# Patient Record
Sex: MALE | Race: WHITE | NOT HISPANIC OR LATINO | Employment: OTHER | URBAN - METROPOLITAN AREA
[De-identification: names, ages, dates, MRNs, and addresses within clinical notes are randomized per-mention and may not be internally consistent; named-entity substitution may affect disease eponyms.]

---

## 2024-04-09 ENCOUNTER — APPOINTMENT (OUTPATIENT)
Dept: RADIOLOGY | Facility: CLINIC | Age: 79
End: 2024-04-09
Payer: MEDICARE

## 2024-04-09 VITALS
WEIGHT: 250 LBS | SYSTOLIC BLOOD PRESSURE: 137 MMHG | HEIGHT: 70 IN | DIASTOLIC BLOOD PRESSURE: 78 MMHG | HEART RATE: 90 BPM | BODY MASS INDEX: 35.79 KG/M2

## 2024-04-09 DIAGNOSIS — M25.562 ACUTE PAIN OF LEFT KNEE: ICD-10-CM

## 2024-04-09 DIAGNOSIS — M17.12 PRIMARY OSTEOARTHRITIS OF LEFT KNEE: Primary | ICD-10-CM

## 2024-04-09 PROCEDURE — 73562 X-RAY EXAM OF KNEE 3: CPT

## 2024-04-09 PROCEDURE — 73564 X-RAY EXAM KNEE 4 OR MORE: CPT

## 2024-04-09 PROCEDURE — 99203 OFFICE O/P NEW LOW 30 MIN: CPT | Performed by: ORTHOPAEDIC SURGERY

## 2024-04-09 RX ORDER — AZITHROMYCIN 250 MG/1
TABLET, FILM COATED ORAL
COMMUNITY
Start: 2024-01-05

## 2024-04-09 RX ORDER — LORATADINE 10 MG/1
10 TABLET ORAL DAILY
COMMUNITY

## 2024-04-09 RX ORDER — ROSUVASTATIN CALCIUM 20 MG/1
20 TABLET, COATED ORAL DAILY
COMMUNITY

## 2024-04-09 RX ORDER — CLOPIDOGREL BISULFATE 75 MG/1
TABLET ORAL
COMMUNITY
Start: 2024-04-02

## 2024-04-09 NOTE — PROGRESS NOTES
Ortho Sports Medicine New Patient Visit     Assesment:   78 y.o. male with severe left primary knee osteoarthritis    Plan:    We had a long discussion regarding left knee degenerative joint disease, including treatment options. We discussed non-operative treatment options, such as physical therapy, bracing options, voltaren gel, ibuprofen, steroid injections, viscosupplementation injections, PRP injections, and weight management. The patient elected to proceed with formal PT, medial  brace, and weight management at this time. There is valgus gapping on exam today, so I believe the brace will beneficial for this patient. Given his short-term relief following previous injections, the patient is not interested in injection options at this time. The patient is interested in self-guided weight loss at this time. He did inquire about PRP injections. Because of his severe medial compartment osteoarthritis as reviewed on x-rays today, I do not believe these would be especially beneficial.  However, there is minimal downside if he would like to try them.  If the patient would like to consider PRP in the future, we can refer him to one of my colleagues who performs these. The patient is aware of the out-of-pocket cost.    Ultimately, the patient's extent of arthritic changes would make them a candidate for a knee replacement. If the patient's symptoms do not respond well to the various non-operative interventions described above and is affecting their quality of life, we can refer them to a joint replacement specialist to discuss surgical options.     We will plan to follow up with Jaskaran in 6-8 weeks following PT.        Chief Complaint   Patient presents with    Left Knee - Pain       History of Present Illness:    The patient is a 78 y.o. male presenting for initial evaluation of chronic, atraumatic left knee pain primarily localized to the medial aspect of the knee. The patient has been treated for this by  "[facility] with multiple steroid injections and one series of Visco injections with minimal, short-term pain relief. He is here for a second opinion to discuss treatment options and review left knee MRI from 3/19/24. The patient is not currently using any home therapies. He denies trying bracing or recent formal PT. The patient enjoys golfing but is unable to do so currently secondary to pain. He does note the knee feels it is going to \"give out\" on him occasionally. The patient denies swelling, locking, and numbness/tingling.     The patient's PMH is significant for s/p TAVR, HLD, obesity, prostate cancer. The patient is on Plavix at baseline following his procedure. He denies recent chest pain, SOB, or dyspnea. The patient also denies history of diabetes and is a former smoker. He has not smoked for at least 45 years.      Knee Surgical History:  None    Past Medical, Social and Family History:  Past Medical History:   Diagnosis Date    Coronary artery disease 2023    PAD (peripheral artery disease) (HCC) 2024    both legs     Past Surgical History:   Procedure Laterality Date    PROSTATE CANCER GENE 3(PCA3) (HISTORICAL)  2022    REPLACEMENT AORTIC VALVE TRANSCATHETER (TAVR)  2023     No Known Allergies  Current Outpatient Medications on File Prior to Visit   Medication Sig Dispense Refill    azithromycin (ZITHROMAX) 250 mg tablet       clopidogrel (PLAVIX) 75 mg tablet       loratadine (CLARITIN) 10 mg tablet Take 10 mg by mouth daily      rosuvastatin (CRESTOR) 20 MG tablet Take 20 mg by mouth daily       No current facility-administered medications on file prior to visit.     Social History     Socioeconomic History    Marital status: /Civil Union     Spouse name: Not on file    Number of children: Not on file    Years of education: Not on file    Highest education level: Not on file   Occupational History    Not on file   Tobacco Use    Smoking status: Never    Smokeless tobacco: Never " "  Substance and Sexual Activity    Alcohol use: Yes     Comment: occ    Drug use: Never    Sexual activity: Not on file   Other Topics Concern    Not on file   Social History Narrative    Not on file     Social Determinants of Health     Financial Resource Strain: Not on file   Food Insecurity: Not on file   Transportation Needs: Not on file   Physical Activity: Not on file   Stress: Not on file   Social Connections: Not on file   Intimate Partner Violence: Not on file   Housing Stability: Not on file         I have reviewed the past medical, surgical, social and family history, medications and allergies as documented in the EMR.    Review of systems: ROS is negative other than that noted in the HPI.  Constitutional: Negative for fatigue and fever.   HENT: Negative for sore throat.    Respiratory: Negative for shortness of breath.    Cardiovascular: Negative for chest pain.   Gastrointestinal: Negative for abdominal pain.   Endocrine: Negative for cold intolerance and heat intolerance.   Genitourinary: Negative for flank pain.   Musculoskeletal: Negative for back pain.   Skin: Negative for rash.   Allergic/Immunologic: Negative for immunocompromised state.   Neurological: Negative for dizziness.   Psychiatric/Behavioral: Negative for agitation.      Physical Exam:    Blood pressure 137/78, pulse 90, height 5' 10\" (1.778 m), weight 113 kg (250 lb).    General/Constitutional: NAD, well developed, well nourished  HENT: Normocephalic, atraumatic  CV: Intact distal pulses, regular rate  Resp: No respiratory distress or labored breathing  Lymphatic: No lymphadenopathy palpated  Neuro: Alert and Oriented x 3, no focal deficits  Psych: Normal mood, normal affect  Skin: Warm, dry, no rashes, no erythema      Knee Exam:   No significant skin lesions or deformity. No significant joint effusion Range of motion from 0 to 125.  Mild-moderate medial joint line tenderness. Mild pes anserine tenderness. Valgus gapping with firm " endpoint to valgus stress. Knee is stable to varus stress. Neurovascularly intact distally      Knee Imaging    X-rays of the left knee reviewed and interpreted today. These show no acute fractures. Severe medial compartment and mild patellofemoral compartment degenerative changes. The patella is well centered on the trochlea.    MRI report of the left knee reviewed today from 3/19/24, which demonstrates complex tearof the body and posterior horn of the medial meniscus. Partial tearing of medial meniscus anterior root. Medial compartment joint space narrowing with diffuse full-thickness cartilage loss and subchondral edema. High-grade patellofemoral chondromalacia. MCL sprain. Patellar tendinopathy.

## 2024-04-16 ENCOUNTER — DOCUMENTATION (OUTPATIENT)
Dept: PAIN MEDICINE | Facility: CLINIC | Age: 79
End: 2024-04-16

## 2024-04-16 ENCOUNTER — TELEPHONE (OUTPATIENT)
Age: 79
End: 2024-04-16

## 2024-04-16 NOTE — PROGRESS NOTES
I was able to get an email back about the medial  brace. I was informed by Carmina that he spoke with the patient and put in the pre-authorization in order to get the brace fitting process started.

## 2024-04-16 NOTE — TELEPHONE ENCOUNTER
Caller: Self    Doctor: Robert    Reason for call: Patient has not heard from brace fitter yet. Is there a number he can call or a store he can go to?    Call back#: [phone]

## 2024-04-16 NOTE — TELEPHONE ENCOUNTER
I reached out to DME in order to get an update about this brace. So I told the patient that they should hopefully hear from them by the end of this week and if they do not I told them to call the office back for an update.

## 2024-04-22 ENCOUNTER — EVALUATION (OUTPATIENT)
Dept: PHYSICAL THERAPY | Facility: CLINIC | Age: 79
End: 2024-04-22
Payer: MEDICARE

## 2024-04-22 DIAGNOSIS — M25.562 CHRONIC PAIN OF LEFT KNEE: Primary | ICD-10-CM

## 2024-04-22 DIAGNOSIS — M17.12 PRIMARY OSTEOARTHRITIS OF LEFT KNEE: ICD-10-CM

## 2024-04-22 DIAGNOSIS — G89.29 CHRONIC PAIN OF LEFT KNEE: Primary | ICD-10-CM

## 2024-04-22 PROCEDURE — 97161 PT EVAL LOW COMPLEX 20 MIN: CPT

## 2024-04-22 PROCEDURE — 97110 THERAPEUTIC EXERCISES: CPT

## 2024-04-22 NOTE — PROGRESS NOTES
PT Evaluation     Today's date: 2024  Patient name: Jaskaran Lopez  : 1945  MRN: [id number]  Referring provider: Claribel Nye PA-C  Dx:   Encounter Diagnosis     ICD-10-CM    1. Chronic pain of left knee  M25.562     G89.29       2. Primary osteoarthritis of left knee  M17.12 Ambulatory Referral to Physical Therapy          Start Time: 0845  Stop Time: 930  Total time in clinic (min): 45 minutes    Assessment  Assessment details: Pt is a 78 y.o male who presents to skilled physical therapy with complaints of chronic L knee pain for over 6 months. Pt demonstrated an asymmetric and antalgic gait pattern, impaired balance, altered functional mechanics with squats and steps, and decreased L knee MMT and A/PROM. Pt's hx and clinical findings are consistent with the referring diagnosis of L knee osteoarthritis. Pt's pain and deficits are preventing him from performing self care, ADLS, and recreational activities without compensations. Pt was educated on his diagnosis, prognosis, POC, and HEP. Pt would benefit from skilled physical therapy to reduce his pain, address his deficits, progress towards his goals, and return to his PLOF.   Impairments: abnormal coordination, abnormal gait, abnormal or restricted ROM, abnormal movement, activity intolerance, impaired balance, impaired physical strength, lacks appropriate home exercise program, pain with function, safety issue and poor body mechanics    Symptom irritability: moderateUnderstanding of Dx/Px/POC: good   Prognosis: good    Goals  Short Term Goals:  1) Pt will initiate and progress his HEP in 3-4 weeks.  2) Pt will improve his knee MMT to improve his ADLs in 3-4 weeks.  3) Pt will improve his knee ROM to be symmetrical to his R knee to improve his gait in 3-4 weeks.    Long Term Goals:  1) Pt will be able to ambulate for at least 15 mins with less than 2/10 pain in 6-8 weeks.  2) Pt will be able to ambulate up and down 15 steps with less than 2/10 pain in  6-8 weeks.  3) Pt will be able to perform his ADLs with less than 3/10 pain in 6-8 weeks.     Plan  Patient would benefit from: PT eval and skilled physical therapy  Planned modality interventions: cryotherapy and thermotherapy: hydrocollator packs  Planned therapy interventions: activity modification, ADL retraining, joint mobilization, manual therapy, balance, motor coordination training, body mechanics training, coordination, neuromuscular re-education, patient education, postural training, self care, flexibility, functional ROM exercises, gait training, graded exercise, stretching, strengthening, therapeutic activities, therapeutic exercise and home exercise program  Frequency: 2x week  Duration in weeks: 6  Plan of Care beginning date: 2024  Plan of Care expiration date: 6/3/2024  Treatment plan discussed with: patient        Subjective Evaluation    History of Present Illness  Mechanism of injury: Pt was treated for prostate cancer for 2 years, he has his first follow-up in 2 weeks to ensure his tests are normal. Pt had an aortic valve replaced. Pt had several procedures on his legs and may need another procedure. Pt has been having about 6 months worth of pain on his L knee. Pt's balance is tough as well as going up/down stairs, he requires railings and going up one step at a time. Pt goes grocery shopping but needs the cart for stability. Pt wants to lose weight and play golf. Walking in his yard is very tough due to the pain and stability issues. Pt takes tylenol in the morning to help with the pain. No previous surgeries, had injections in the past but hasn't had any relief.   Patient Goals  Patient goals for therapy: decreased pain, improved balance, increased motion, increased strength, independence with ADLs/IADLs and return to sport/leisure activities  Patient goal: walk, be active, safe, golf  Pain  Current pain ratin  At best pain ratin  At worst pain ratin  Location: medial joint  "line of L knee  Quality: dull ache and burning  Relieving factors: medications  Aggravating factors: standing, walking, stair climbing and running  Progression: worsening    Social Support  Steps to enter house: yes  11  Stairs in house: yes   Lives in: multiple-level home  Lives with: spouse    Employment status: not working  Exercise history: walking      Diagnostic Tests  X-ray: abnormal        Objective     Tenderness     Additional Tenderness Details  No TTP    Active Range of Motion   Left Knee   Flexion: 113 degrees   Extension: -4 degrees     Right Knee   Flexion: 120 degrees   Extension: 0 degrees     Passive Range of Motion   Left Knee   Flexion: 119 degrees with pain  Extension: -2 degrees with pain    Right Knee   Flexion: 123 degrees   Extension: 1 degrees     Strength/Myotome Testing     Left Knee   Flexion: 4-  Extension: 3+  Quadriceps contraction: fair    Right Knee   Flexion: 4+  Extension: 4+  Quadriceps contraction: good    Ambulation   Weight-Bearing Status   Weight-Bearing Status (Left): full weight bearing   Weight-Bearing Status (Right): full weight-bearing    Assistive device used: none    Observational Gait   Gait: antalgic and asymmetric   Increased right stance time and left swing time. Decreased walking speed, stride length, left stance time, right swing time, left step length and right step length.   Left foot contact pattern: foot flat  Right foot contact pattern: foot flat  Left arm swing: decreased  Right arm swing: decreased  Base of support: increased    Additional Observational Gait Details  Varus deformity of L knee    Functional Assessment      Squat    Pain, left tibial anterior translation beyond toes and right tibial anterior translation beyond toes.     Forward Step Up 6\"   Left Leg  Pain and increased contralateral push off.     Right Leg  Within functional limits.     Forward Step Down 6\"   Left Leg  Pain, contralateral trunk side bending and contralateral toe touch first. "     Right Leg  Within functional limits.     Single Leg Stance   Left: 2 seconds  Right: 9 seconds                Insurance:  A/CMS Eval/ Re-eval Auth #/ Referral # Total units or visits Start date  Expiration date KX? Visit limitation?  PT only or  PT+OT? Co-Insurance   CMS 4/22/24 4/9/24 4/9/25    100%                 POC Start Date POC Expiration Date Signed POC?   4/22/24 6/3/24 pending      Date 4/22              Visits/Units:  Used 1              Authed:  Remaining                      Precautions: CAD, PAD, aortic valve replacement, hx of cancer  HEP: Access Code [email or web address]    Date     4/22/24   Visit Number     1 (IE)   Manuals                                        Neuro Re-Ed                                                                 Ther Ex                                                                        Ther Activity                        Gait Training                        Modalities

## 2024-04-25 ENCOUNTER — OFFICE VISIT (OUTPATIENT)
Dept: PHYSICAL THERAPY | Facility: CLINIC | Age: 79
End: 2024-04-25
Payer: MEDICARE

## 2024-04-25 DIAGNOSIS — M17.12 PRIMARY OSTEOARTHRITIS OF LEFT KNEE: Primary | ICD-10-CM

## 2024-04-25 DIAGNOSIS — G89.29 CHRONIC PAIN OF LEFT KNEE: ICD-10-CM

## 2024-04-25 DIAGNOSIS — M25.562 CHRONIC PAIN OF LEFT KNEE: ICD-10-CM

## 2024-04-25 PROCEDURE — 97112 NEUROMUSCULAR REEDUCATION: CPT

## 2024-04-25 PROCEDURE — 97110 THERAPEUTIC EXERCISES: CPT

## 2024-04-25 NOTE — TELEPHONE ENCOUNTER
[facility] emailed me and stated that they were able to contact the patient regarding this brace. So the patient should have an appointment set to get this fitted.

## 2024-04-25 NOTE — PROGRESS NOTES
[facility] reached out to me and informed me that they were able to get in contact with the patient and set an appointment to get him fitted for a brace.

## 2024-04-25 NOTE — TELEPHONE ENCOUNTER
Patient is calling back again to talk to Rosalba. He still has not received the brace or a call regarding it. He is getting frustrated and would like you to call him back please. Thank you.  His # is [phone]

## 2024-04-25 NOTE — PROGRESS NOTES
Daily Note     Today's date: 2024  Patient name: Jaskaran Lopez  : 1945  MRN: [id number]  Referring provider: Claribel Nye PA-C  Dx:   Encounter Diagnosis     ICD-10-CM    1. Primary osteoarthritis of left knee  M17.12       2. Chronic pain of left knee  M25.562     G89.29           Start Time: 935  Stop Time: 1015  Total time in clinic (min): 40 minutes    Subjective: Pt reports that his hip and lateral L knee were very sore after his evaluation and after doing his HEP. Pt states diligently performing his exercises, but wasn't as sore as the first day. Pt states 5/10 pain prior to the start of his session.       Objective: See treatment diary below      Assessment: Tolerated treatment well. Patient demonstrated fatigue post treatment, exhibited good technique with therapeutic exercises, and would benefit from continued PT      Plan: Continue per plan of care.  Progress treatment as tolerated.        Insurance:  A/CMS Eval/ Re-eval Auth #/ Referral # Total units or visits Start date  Expiration date KX? Visit limitation?  PT only or  PT+OT? Co-Insurance   CMS 24    100%                 POC Start Date POC Expiration Date Signed POC?   4/22/24 6/3/24 pending      Date              Visits/Units:  Used 1 2             Authed:  Remaining                      Precautions: CAD, PAD, aortic valve replacement, hx of cancer  HEP: Access Code [email or web address]    Date    24   Visit Number    2 1 (IE)   Manuals                                        Neuro Re-Ed         Quad sets    2x10 B    SLR    1x15    bridges    2x10    Bent knee fallout    RTB 2x10 B    LAQ    2x10    TKE    [place] 5.5 1x10            Ther Ex        HS str     3x30s B    Heel slides    2x10                                                    Ther Activity                        Gait Training                        Modalities

## 2024-04-25 NOTE — TELEPHONE ENCOUNTER
I attempted to reach out to [employer] twice through via. I will forward my email once again and notify the patient if I receive any updates.

## 2024-04-29 ENCOUNTER — OFFICE VISIT (OUTPATIENT)
Dept: PHYSICAL THERAPY | Facility: CLINIC | Age: 79
End: 2024-04-29
Payer: MEDICARE

## 2024-04-29 DIAGNOSIS — M25.562 CHRONIC PAIN OF LEFT KNEE: ICD-10-CM

## 2024-04-29 DIAGNOSIS — G89.29 CHRONIC PAIN OF LEFT KNEE: ICD-10-CM

## 2024-04-29 DIAGNOSIS — M17.12 PRIMARY OSTEOARTHRITIS OF LEFT KNEE: Primary | ICD-10-CM

## 2024-04-29 PROCEDURE — 97112 NEUROMUSCULAR REEDUCATION: CPT

## 2024-04-29 PROCEDURE — 97110 THERAPEUTIC EXERCISES: CPT

## 2024-04-29 NOTE — PROGRESS NOTES
Daily Note     Today's date: 2024  Patient name: Jaskaran Lopez  : 1945  MRN: [id number]  Referring provider: Claribel Nye PA-C  Dx:   Encounter Diagnosis     ICD-10-CM    1. Primary osteoarthritis of left knee  M17.12       2. Chronic pain of left knee  M25.562     G89.29           Start Time: 849  Stop Time: 930  Total time in clinic (min): 41 minutes    Subjective: Pt reports attending a wedding over the weekend and being on his feet a lot so his BLE were a little more sore and painful. Pt states being consistent with his HEP, his hips were sore after his last session. No knee pain prior to the start of his session at rest.       Objective: See treatment diary below      Assessment: Tolerated treatment well. Patient demonstrated fatigue post treatment, exhibited good technique with therapeutic exercises, and would benefit from continued PT. Pt given VC for proper squatting mechanics to avoid knee valgus and engage glutes. Pt didn't have any increase in knee pain throughout the session and demonstrated good carryover form the previous session.       Plan: Continue per plan of care.  Progress treatment as tolerated.        Insurance:  A/CMS Eval/ Re-eval Auth #/ Referral # Total units or visits Start date  Expiration date KX? Visit limitation?  PT only or  PT+OT? Co-Insurance   CMS 24    100%                 POC Start Date POC Expiration Date Signed POC?   4/22/24 6/3/24 pending      Date             Visits/Units:  Used 1 2 3            Authed:  Remaining                      Precautions: CAD, PAD, aortic valve replacement, hx of cancer  HEP: Access Code [email or web address]    Date   24   Visit Number   3 2 1 (IE)   Manuals                                        Neuro Re-Ed         Quad sets   2x10 L 2x10 B    SLR   1x15 1x15    bridges   2x10 2x10    Bent knee fallout   RTB 2x10 B RTB 2x10 B    LAQ   3lbs 2x10 2x10    TKE    [place] 5.5 1x10    Mini squats    2x10     Ther Ex        HS str     3x30s B    Heel slides   2x10 2x10    Standing HS curl   3lbs 2x10     Side stepping   At bar 2 laps                                     Ther Activity                        Gait Training                        Modalities

## 2024-05-02 ENCOUNTER — OFFICE VISIT (OUTPATIENT)
Dept: PHYSICAL THERAPY | Facility: CLINIC | Age: 79
End: 2024-05-02
Payer: MEDICARE

## 2024-05-02 DIAGNOSIS — M25.562 CHRONIC PAIN OF LEFT KNEE: ICD-10-CM

## 2024-05-02 DIAGNOSIS — M17.12 PRIMARY OSTEOARTHRITIS OF LEFT KNEE: Primary | ICD-10-CM

## 2024-05-02 DIAGNOSIS — G89.29 CHRONIC PAIN OF LEFT KNEE: ICD-10-CM

## 2024-05-02 PROCEDURE — 97112 NEUROMUSCULAR REEDUCATION: CPT

## 2024-05-02 PROCEDURE — 97110 THERAPEUTIC EXERCISES: CPT

## 2024-05-02 NOTE — PROGRESS NOTES
Daily Note     Today's date: 2024  Patient name: Jaskaran Lopez  : 1945  MRN: [id number]  Referring provider: Claribel Nye PA-C  Dx:   Encounter Diagnosis     ICD-10-CM    1. Primary osteoarthritis of left knee  M17.12       2. Chronic pain of left knee  M25.562     G89.29           Start Time: 925  Stop Time: 1015  Total time in clinic (min): 50 minutes    Subjective: Pt states 6/10 pain when he is standing or walking, when he is unloaded it is typically fine. Pt states it was bothering him sleeping the other night. Pt got his knee brace which he states immediately helped with some of his pain walking.       Objective: See treatment diary below      Assessment: Tolerated treatment well. Patient demonstrated fatigue post treatment, exhibited good technique with therapeutic exercises, and would benefit from continued PT      Plan: Continue per plan of care.  Progress treatment as tolerated.        Insurance:  A/CMS Eval/ Re-eval Auth #/ Referral # Total units or visits Start date  Expiration date KX? Visit limitation?  PT only or  PT+OT? Co-Insurance   CMS 24    100%                 POC Start Date POC Expiration Date Signed POC?   4/22/24 6/3/24 pending      Date            Visits/Units:  Used 1 2 3 4           Authed:  Remaining                      Precautions: CAD, PAD, aortic valve replacement, hx of cancer  HEP: Access Code [email or web address]    Date  24   Visit Number  4 3 2 1 (IE)   Manuals                                        Neuro Re-Ed         Quad sets   2x10 L 2x10 B    SLR  1x15 1x15 1x15    bridges  1x15  1x15 w/march 2x10 2x10    clamshells  W/VC 2x10 B      Bent knee fallout   RTB 2x10 B RTB 2x10 B    LAQ  5lbs 2x10 B 3lbs 2x10 2x10    TKE    [place] 5.5 1x10    STS  1x10 from chair      Mini squats   2x10     Foam balance  Feet together 1x30s    No airex tandem 2x30s      Ther Ex        HS str   3x30s B  3x30s B    Heel slides   2x10  2x10    Standing HS curl  5lbs 2x10 B 3lbs 2x10     Side stepping  At bar 3 laps At bar 2 laps                                     Ther Activity                        Gait Training                        Modalities

## 2024-05-06 ENCOUNTER — OFFICE VISIT (OUTPATIENT)
Dept: PHYSICAL THERAPY | Facility: CLINIC | Age: 79
End: 2024-05-06
Payer: MEDICARE

## 2024-05-06 DIAGNOSIS — M25.562 CHRONIC PAIN OF LEFT KNEE: ICD-10-CM

## 2024-05-06 DIAGNOSIS — M17.12 PRIMARY OSTEOARTHRITIS OF LEFT KNEE: Primary | ICD-10-CM

## 2024-05-06 DIAGNOSIS — G89.29 CHRONIC PAIN OF LEFT KNEE: ICD-10-CM

## 2024-05-06 PROCEDURE — 97112 NEUROMUSCULAR REEDUCATION: CPT

## 2024-05-06 NOTE — PROGRESS NOTES
Daily Note     Today's date: 2024  Patient name: Jaskaran Lopez  : 1945  MRN: [id number]  Referring provider: Claribel Nye PA-C  Dx:   Encounter Diagnosis     ICD-10-CM    1. Primary osteoarthritis of left knee  M17.12       2. Chronic pain of left knee  M25.562     G89.29           Start Time: 933  Stop Time: 1015  Total time in clinic (min): 42 minutes    Subjective: Pt states that his legs and his knee were sore over the weekend, he continues to be compliant with his HEP typically getting to his exercises 2x/day. Pt states noticing the pain slightly less, he is also still trying to get used to his brace.       Objective: See treatment diary below      Assessment: Tolerated treatment well. Patient demonstrated fatigue post treatment, exhibited good technique with therapeutic exercises, and would benefit from continued PT. Introduced more balance and motor control exercises today with airex beam walking and wobbleboard. Pt needed BUE support with airex exercises and small breaks throughout standing exercises.       Plan: Continue per plan of care.  Progress treatment as tolerated.        Insurance:  A/CMS Eval/ Re-eval Auth #/ Referral # Total units or visits Start date  Expiration date KX? Visit limitation?  PT only or  PT+OT? Co-Insurance   CMS 24    100%                 POC Start Date POC Expiration Date Signed POC?   4/22/24 6/3/24 pending      Date           Visits/Units:  Used 1 2 3 4 5          Authed:  Remaining                      Precautions: CAD, PAD, aortic valve replacement, hx of cancer  HEP: Access Code [email or web address]    Date 24   Visit Number 5 4 3 2 1 (IE)   Manuals                                        Neuro Re-Ed         Quad sets   2x10 L 2x10 B    SLR 2x10 1x15 1x15 1x15    bridges 1x15  1x15 /march 1x15  1x15 /march 2x10 2x10    clamshells W/VC 2x10 B W/VC 2x10 B      Bent knee fallout   RTB 2x10 B RTB 2x10 B    LAQ   5lbs 2x10 B 3lbs 2x10 2x10    TKE    [place] 5.5 1x10    STS 1x15 from chair 1x10 from chair      Mini squats   2x10     wobbleboard ML taps 3x10       Beam walking Lateral 5 laps  Fwd 5 laps       Foam balance  Feet together 1x30s    No airex tandem 2x30s      Ther Ex        HS str  3x30s B 3x30s B  3x30s B    Heel slides   2x10 2x10    Standing HS curl  5lbs 2x10 B 3lbs 2x10     Side stepping  At bar 3 laps At bar 2 laps                                     Ther Activity                        Gait Training                        Modalities

## 2024-05-09 ENCOUNTER — OFFICE VISIT (OUTPATIENT)
Dept: PHYSICAL THERAPY | Facility: CLINIC | Age: 79
End: 2024-05-09
Payer: MEDICARE

## 2024-05-09 DIAGNOSIS — M17.12 PRIMARY OSTEOARTHRITIS OF LEFT KNEE: Primary | ICD-10-CM

## 2024-05-09 DIAGNOSIS — G89.29 CHRONIC PAIN OF LEFT KNEE: ICD-10-CM

## 2024-05-09 DIAGNOSIS — M25.562 CHRONIC PAIN OF LEFT KNEE: ICD-10-CM

## 2024-05-09 PROCEDURE — 97110 THERAPEUTIC EXERCISES: CPT

## 2024-05-09 PROCEDURE — 97112 NEUROMUSCULAR REEDUCATION: CPT

## 2024-05-09 NOTE — PROGRESS NOTES
Daily Note     Today's date: 2024  Patient name: Jaskaran Lopez  : 1945  MRN: [id number]  Referring provider: Claribel Nye PA-C  Dx:   Encounter Diagnosis     ICD-10-CM    1. Primary osteoarthritis of left knee  M17.12       2. Chronic pain of left knee  M25.562     G89.29           Start Time: 925  Stop Time: 1012  Total time in clinic (min): 47 minutes    Subjective: Pt reports soreness in his LE's after his last treatment session, but no increased pain in his knee. No new complaints.      Objective: See treatment diary below      Assessment: Tolerated treatment well. Patient demonstrated fatigue post treatment, exhibited good technique with therapeutic exercises, and would benefit from continued PT. Pt demonstrated improved tolerance with his exercises and increased strength of his quads with the ease of his LAQ and STS.       Plan: Continue per plan of care.  Progress treatment as tolerated.        Insurance:  [place]/CMS Eval/ Re-eval Auth #/ Referral # Total units or visits Start date  Expiration date KX? Visit limitation?  PT only or  PT+OT? Co-Insurance   CMS 24    100%                 POC Start Date POC Expiration Date Signed POC?   4/22/24 6/3/24 pending      Date          Visits/Units:  Used 1 2 3 4 5 6         Authed:  Remaining                      Precautions: CAD, PAD, aortic valve replacement, hx of cancer  HEP: Access Code [email or web address]    Date 24   Visit Number 6 5 4 3 2 1 (IE)   Manuals                                             Neuro Re-Ed          Quad sets    2x10 L 2x10 B    SLR  2x10 1x15 1x15 1x15    bridges 2x10 w/march 1x15  1x15 w/march 1x15  1x15 w/march 2x10 2x10    clamshells W/VC 2x10 B W/VC 2x10 B W/VC 2x10 B      Bent knee fallout    RTB 2x10 B RTB 2x10 B    LAQ 5lbs 2x10 B  5lbs 2x10 B 3lbs 2x10 2x10    TKE     Alberto 5.5 1x10    STS 2x10 from airex on chair 1x15 from chair 1x10 from chair      Mini  squats    2x10     wobbleboard  ML taps 3x10       Beam walking Lateral 5 laps  Fwd & retro 5 laps Lateral 5 laps  Fwd 5 laps       Foam balance   Feet together 1x30s    No airex tandem 2x30s      Ther Ex         HS str  2x30s B 3x30s B 3x30s B  3x30s B    Heel slides    2x10 2x10    Standing HS curl 5lbs 15x B  5lbs 2x10 B 3lbs 2x10     Side stepping   At bar 3 laps At bar 2 laps     Standing heel raises 2x10        Standing hip abd 1x15 B                          Ther Activity                           Gait Training                           Modalities

## 2024-05-13 ENCOUNTER — OFFICE VISIT (OUTPATIENT)
Dept: PHYSICAL THERAPY | Facility: CLINIC | Age: 79
End: 2024-05-13
Payer: MEDICARE

## 2024-05-13 DIAGNOSIS — M17.12 PRIMARY OSTEOARTHRITIS OF LEFT KNEE: Primary | ICD-10-CM

## 2024-05-13 DIAGNOSIS — M25.562 CHRONIC PAIN OF LEFT KNEE: ICD-10-CM

## 2024-05-13 DIAGNOSIS — G89.29 CHRONIC PAIN OF LEFT KNEE: ICD-10-CM

## 2024-05-13 PROCEDURE — 97110 THERAPEUTIC EXERCISES: CPT

## 2024-05-13 PROCEDURE — 97112 NEUROMUSCULAR REEDUCATION: CPT

## 2024-05-13 NOTE — PROGRESS NOTES
Daily Note     Today's date: 2024  Patient name: Jaskaran Lopez  : 1945  MRN: [id number]  Referring provider: Claribel Nye PA-C  Dx:   Encounter Diagnosis     ICD-10-CM    1. Primary osteoarthritis of left knee  M17.12       2. Chronic pain of left knee  M25.562     G89.29           Start Time: 935  Stop Time: 1017  Total time in clinic (min): 42 minutes    Subjective: Pt reports that his knees continue to have some pain, hasn't noticed as much of a change with that. However pt does feel that things around the house are getting easier. Walking and going up and down steps are still painful. Pt continues to wear his knee brace to assist with the pain throughout the day. Pt was sore for 3 days after his last session in his low back, hips, knees, and calves.       Objective: See treatment diary below      Assessment: Tolerated treatment well. Patient demonstrated fatigue post treatment, exhibited good technique with therapeutic exercises, and would benefit from continued PT. Due to pt's increased and prolonged soreness, pt was given additional LE stretches which helped with his flexibility and muscle soreness. Pt was educated on modifying activities throughout the day to combat the knee pain. Pt's HEP was updated.       Plan: Continue per plan of care.  Progress treatment as tolerated.        Insurance:  A/CMS Eval/ Re-eval Auth #/ Referral # Total units or visits Start date  Expiration date KX? Visit limitation?  PT only or  PT+OT? Co-Insurance   CMS 24    100%                 POC Start Date POC Expiration Date Signed POC?   4/22/24 6/3/24 pending      Date         Visits/Units:  Used 1 2 3 4 5 6 7        Authed:  Remaining                      Precautions: CAD, PAD, aortic valve replacement, hx of cancer  HEP: Access Code [email or web address]    Date    Visit Number 7 6 5 4 3 2   Manuals                                             Neuro  Re-Ed          Quad sets     2x10 L 2x10 B   SLR   2x10 1x15 1x15 1x15   bridges 2x10 w/march 2x10 w/march 1x15  1x15 w/march 1x15  1x15 w/march 2x10 2x10   clamshells  W/VC 2x10 B W/VC 2x10 B W/VC 2x10 B     Bent knee fallout     RTB 2x10 B RTB 2x10 B   LAQ 5lbs 1x15 B 5lbs 2x10 B  5lbs 2x10 B 3lbs 2x10 2x10   TKE      Alberto 5.5 1x10   STS  2x10 from airex on chair 1x15 from chair 1x10 from chair     Mini squats Squats on airex 1x10    2x10    wobbleboard   ML taps 3x10      Beam walking  Lateral 5 laps  Fwd & retro 5 laps Lateral 5 laps  Fwd 5 laps      Foam balance Airex heel raises 2x10   Feet together 1x30s    No airex tandem 2x30s     Ther Ex         HS str   2x30s B 3x30s B 3x30s B  3x30s B   Heel slides     2x10 2x10   Standing HS curl 5lbs 1x15 B 5lbs 15x B  5lbs 2x10 B 3lbs 2x10    Side stepping    At bar 3 laps At bar 2 laps    Standing heel raises  2x10       Standing hip abd  1x15 B       Modified ashleigh str 2x30s B        LTR 10x5s        HUGH str 2x30s B        Piriformis str 2x30s B        Ther Activity                           Gait Training                           Modalities

## 2024-05-16 ENCOUNTER — EVALUATION (OUTPATIENT)
Dept: PHYSICAL THERAPY | Facility: CLINIC | Age: 79
End: 2024-05-16
Payer: MEDICARE

## 2024-05-16 DIAGNOSIS — M25.562 CHRONIC PAIN OF LEFT KNEE: ICD-10-CM

## 2024-05-16 DIAGNOSIS — M17.12 PRIMARY OSTEOARTHRITIS OF LEFT KNEE: Primary | ICD-10-CM

## 2024-05-16 DIAGNOSIS — G89.29 CHRONIC PAIN OF LEFT KNEE: ICD-10-CM

## 2024-05-16 PROCEDURE — 97112 NEUROMUSCULAR REEDUCATION: CPT

## 2024-05-16 PROCEDURE — 97110 THERAPEUTIC EXERCISES: CPT

## 2024-05-16 NOTE — LETTER
May 16, 2024    Claribel Nye PA-C  [address], [address]  [facility] [address]    Patient: Jaskaran Lopez   YOB: 1945   Date of Visit: 2024     Encounter Diagnosis     ICD-10-CM    1. Primary osteoarthritis of left knee  M17.12       2. Chronic pain of left knee  M25.562     G89.29           Dear Dr. Nye:    Thank you for your recent referral of Jaskaran Lopez. Please review the attached evaluation summary from Jaskaran's recent visit.     Please verify that you agree with the plan of care by signing the attached order.     If you have any questions or concerns, please do not hesitate to call.     I sincerely appreciate the opportunity to share in the care of one of your patients and hope to have another opportunity to work with you in the near future.       Sincerely,    Corbin Abebe, PT      Referring Provider:      I certify that I have read the below Plan of Care and certify the need for these services furnished under this plan of treatment while under my care.                    Claribel Nye PA-C  [address], [address]  [facility] [address]  Via In Basket          PT Re-Evaluation     Today's date: 2024  Patient name: Jaskaran Lopez  : 1945  MRN: [id number]  Referring provider: Claribel Nye PA-C  Dx:   Encounter Diagnosis     ICD-10-CM    1. Primary osteoarthritis of left knee  M17.12       2. Chronic pain of left knee  M25.562     G89.29             Start Time: 938  Stop Time:   Total time in clinic (min): 44 minutes    Assessment  Impairments: abnormal coordination, abnormal gait, abnormal or restricted ROM, abnormal movement, activity intolerance, impaired balance, impaired physical strength, pain with function, safety issue and poor body mechanics  Symptom irritability: low    Assessment details: Pt is a 79 y.o male who presents to skilled physical therapy with complaints of chronic L knee pain for over 6  months. Pt continued to demonstrate an asymmetric and antalgic gait pattern, slightly improved balance on the L, altered functional mechanics with squats and steps, and increased L knee MMT and A/PROM. Pt appears to be making steady progress as seen by his decreased pain during functional movements and improved deficits, however he continues to have pain and difficulty with walking, stairs, and hasn't returned to golf. Pt's remaining pain and deficits are preventing him from performing self care, ADLS, and recreational activities without compensations. Pt was re-educated on his diagnosis, prognosis, POC, and HEP. Pt would continue to benefit from skilled physical therapy to reduce his pain, address his deficits, progress towards his goals, and return to his PLOF.   Understanding of Dx/Px/POC: good     Prognosis: good    Goals  Short Term Goals:  1) Pt will initiate and progress his HEP in 3-4 weeks.- Met  2) Pt will improve his knee MMT to improve his ADLs in 3-4 weeks.- Met  3) Pt will improve his knee ROM to be symmetrical to his R knee to improve his gait in 3-4 weeks.- Progressing    Long Term Goals:  1) Pt will be able to ambulate for at least 15 mins with less than 2/10 pain in 6-8 weeks.- Progressing  2) Pt will be able to ambulate up and down 15 steps with less than 2/10 pain in 6-8 weeks.- Progressing  3) Pt will be able to perform his ADLs with less than 3/10 pain in 6-8 weeks. -Progressing    Plan  Patient would benefit from: PT eval and skilled physical therapy  Planned modality interventions: cryotherapy and thermotherapy: hydrocollator packs    Planned therapy interventions: activity modification, ADL retraining, joint mobilization, manual therapy, balance, motor coordination training, body mechanics training, coordination, neuromuscular re-education, patient education, postural training, self care, flexibility, functional ROM exercises, gait training, graded exercise, stretching, strengthening,  therapeutic activities, therapeutic exercise and home exercise program    Frequency: 2x week  Duration in weeks: 6  Plan of Care beginning date: 2024  Plan of Care expiration date: 2024  Treatment plan discussed with: patient        Subjective Evaluation    History of Present Illness  Mechanism of injury: 24 Update:  Pt feels that he is making progress with his leg strength and flexibility. He has a hard time seeing if he is making great progress with the pain at this point. Pt continues to utilize his brace. Pt feels he is better conditioned with his LE's, and is able to do more things but the pain is still there. Walking in the yard is slightly better, walking in general is still not great. Pt feels he isn't fully recovered from his angioplasty which is contributing his walking issues. When he wakes up he doesn't take tylenol right away anymore, as he moves around it does feel a little better. Stairs are still difficult but he is carrying more into the house.   Patient Goals  Patient goals for therapy: decreased pain, improved balance, increased motion, increased strength, independence with ADLs/IADLs and return to sport/leisure activities  Patient goal: walk, be active, safe, golf  Pain  Current pain ratin  At best pain ratin  At worst pain ratin  Location: medial joint line of L knee  Quality: dull ache, burning and sharp  Relieving factors: medications  Aggravating factors: standing, walking, stair climbing and running  Symptom course: slightly improved.    Social Support  Steps to enter house: yes  11  Stairs in house: yes   Lives in: multiple-level home  Lives with: spouse    Employment status: not working  Exercise history: walking      Diagnostic Tests  X-ray: abnormal        Objective     Tenderness   Left Knee   Tenderness in the MCL (distal) and medial joint line.     Active Range of Motion   Left Knee   Flexion: 120 degrees   Extension: -2 degrees     Right Knee   Flexion: 120  "degrees   Extension: 0 degrees     Passive Range of Motion   Left Knee   Flexion: 126 degrees   Extension: -1 degrees     Right Knee   Flexion: 123 degrees   Extension: 1 degrees     Strength/Myotome Testing     Left Knee   Flexion: 4+  Extension: 4-  Quadriceps contraction: good    Right Knee   Flexion: 4+  Extension: 4+  Quadriceps contraction: good    Ambulation   Weight-Bearing Status   Weight-Bearing Status (Left): full weight bearing   Weight-Bearing Status (Right): full weight-bearing    Assistive device used: none    Observational Gait   Gait: antalgic and asymmetric   Increased right stance time and left swing time. Decreased walking speed, stride length, left stance time, right swing time, left step length and right step length.   Left foot contact pattern: foot flat  Right foot contact pattern: foot flat  Left arm swing: decreased  Right arm swing: decreased  Base of support: increased    Additional Observational Gait Details  Varus deformity of L knee    Functional Assessment      Squat    Left tibial anterior translation beyond toes and right tibial anterior translation beyond toes.     Forward Step Up 6\"   Left Leg  Increased contralateral push off. No pain.     Right Leg  Within functional limits.     Forward Step Down 6\"   Left Leg  Pain, contralateral trunk side bending and contralateral toe touch first.     Right Leg  Within functional limits.     Single Leg Stance   Left: 5 seconds  Right: 4 seconds                Insurance:  A/CMS Eval/ Re-eval Auth #/ Referral # Total units or visits Start date  Expiration date KX? Visit limitation?  PT only or  PT+OT? Co-Insurance   CMS 4/22/24 4/9/24 4/9/25    100%                 POC Start Date POC Expiration Date Signed POC?   4/22/24 6/3/24 pending      Date 4/22 4/25 4/29 5/2 5/6 5/9 5/13 5/16       Visits/Units:  Used 1 2 3 4 5 6 7 8       Authed:  Remaining                      Precautions: CAD, PAD, aortic valve replacement, hx of cancer  HEP: " Access Code [email or web address]    Date 5/16 5/13 5/9 5/6 5/2   Visit Number 8 7 6 5 4   Manuals                                        Neuro Re-Ed         Quad sets        SLR    2x10 1x15   bridges  2x10 w/march 2x10 w/march 1x15  1x15 w/march 1x15  1x15 w/march   clamshells   W/VC 2x10 B W/VC 2x10 B W/VC 2x10 B   Bent knee fallout        LAQ  5lbs 1x15 B 5lbs 2x10 B  5lbs 2x10 B   TKE        STS   2x10 from airex on chair 1x15 from chair 1x10 from chair   Mini squats  Squats on airex 1x10      wobbleboard    ML taps 3x10    Beam walking   Lateral 5 laps  Fwd & retro 5 laps Lateral 5 laps  Fwd 5 laps    Foam balance  Airex heel raises 2x10   Feet together 1x30s    No airex tandem 2x30s   Ther Ex        HS str    2x30s B 3x30s B 3x30s B   Heel slides        Standing HS curl  5lbs 1x15 B 5lbs 15x B  5lbs 2x10 B   Side stepping     At bar 3 laps   Standing heel raises   2x10     Standing hip abd   1x15 B     Modified ashleigh str  2x30s B      LTR  10x5s      HUGH str  2x30s B      Piriformis str  2x30s B      Ther Activity                        Gait Training                        Modalities

## 2024-05-16 NOTE — PROGRESS NOTES
PT Re-Evaluation     Today's date: 2024  Patient name: Jaskaran Lopez  : 1945  MRN: [id number]  Referring provider: Claribel Nye PA-C  Dx:   Encounter Diagnosis     ICD-10-CM    1. Primary osteoarthritis of left knee  M17.12       2. Chronic pain of left knee  M25.562     G89.29             Start Time: 938  Stop Time: 1022  Total time in clinic (min): 44 minutes    Assessment  Impairments: abnormal coordination, abnormal gait, abnormal or restricted ROM, abnormal movement, activity intolerance, impaired balance, impaired physical strength, pain with function, safety issue and poor body mechanics  Symptom irritability: low    Assessment details: Pt is a 79 y.o male who presents to skilled physical therapy with complaints of chronic L knee pain for over 6 months. Pt continued to demonstrate an asymmetric and antalgic gait pattern, slightly improved balance on the L, altered functional mechanics with squats and steps, and increased L knee MMT and A/PROM. Pt appears to be making steady progress as seen by his decreased pain during functional movements and improved deficits, however he continues to have pain and difficulty with walking, stairs, and hasn't returned to golf. Pt's remaining pain and deficits are preventing him from performing self care, ADLS, and recreational activities without compensations. Pt was re-educated on his diagnosis, prognosis, POC, and HEP. Pt would continue to benefit from skilled physical therapy to reduce his pain, address his deficits, progress towards his goals, and return to his PLOF.   Understanding of Dx/Px/POC: good     Prognosis: good    Goals  Short Term Goals:  1) Pt will initiate and progress his HEP in 3-4 weeks.- Met  2) Pt will improve his knee MMT to improve his ADLs in 3-4 weeks.- Met  3) Pt will improve his knee ROM to be symmetrical to his R knee to improve his gait in 3-4 weeks.- Progressing    Long Term Goals:  1) Pt will be able to ambulate for at least 15  mins with less than 2/10 pain in 6-8 weeks.- Progressing  2) Pt will be able to ambulate up and down 15 steps with less than 2/10 pain in 6-8 weeks.- Progressing  3) Pt will be able to perform his ADLs with less than 3/10 pain in 6-8 weeks. -Progressing    Plan  Patient would benefit from: PT eval and skilled physical therapy  Planned modality interventions: cryotherapy and thermotherapy: hydrocollator packs    Planned therapy interventions: activity modification, ADL retraining, joint mobilization, manual therapy, balance, motor coordination training, body mechanics training, coordination, neuromuscular re-education, patient education, postural training, self care, flexibility, functional ROM exercises, gait training, graded exercise, stretching, strengthening, therapeutic activities, therapeutic exercise and home exercise program    Frequency: 2x week  Duration in weeks: 6  Plan of Care beginning date: 5/16/2024  Plan of Care expiration date: 6/27/2024  Treatment plan discussed with: patient        Subjective Evaluation    History of Present Illness  Mechanism of injury: 5/16/24 Update:  Pt feels that he is making progress with his leg strength and flexibility. He has a hard time seeing if he is making great progress with the pain at this point. Pt continues to utilize his brace. Pt feels he is better conditioned with his LE's, and is able to do more things but the pain is still there. Walking in the yard is slightly better, walking in general is still not great. Pt feels he isn't fully recovered from his angioplasty which is contributing his walking issues. When he wakes up he doesn't take tylenol right away anymore, as he moves around it does feel a little better. Stairs are still difficult but he is carrying more into the house.   Patient Goals  Patient goals for therapy: decreased pain, improved balance, increased motion, increased strength, independence with ADLs/IADLs and return to sport/leisure  "activities  Patient goal: walk, be active, safe, golf  Pain  Current pain ratin  At best pain ratin  At worst pain ratin  Location: medial joint line of L knee  Quality: dull ache, burning and sharp  Relieving factors: medications  Aggravating factors: standing, walking, stair climbing and running  Symptom course: slightly improved.    Social Support  Steps to enter house: yes  11  Stairs in house: yes   Lives in: multiple-level home  Lives with: spouse    Employment status: not working  Exercise history: walking      Diagnostic Tests  X-ray: abnormal        Objective     Tenderness   Left Knee   Tenderness in the MCL (distal) and medial joint line.     Active Range of Motion   Left Knee   Flexion: 120 degrees   Extension: -2 degrees     Right Knee   Flexion: 120 degrees   Extension: 0 degrees     Passive Range of Motion   Left Knee   Flexion: 126 degrees   Extension: -1 degrees     Right Knee   Flexion: 123 degrees   Extension: 1 degrees     Strength/Myotome Testing     Left Knee   Flexion: 4+  Extension: 4-  Quadriceps contraction: good    Right Knee   Flexion: 4+  Extension: 4+  Quadriceps contraction: good    Ambulation   Weight-Bearing Status   Weight-Bearing Status (Left): full weight bearing   Weight-Bearing Status (Right): full weight-bearing    Assistive device used: none    Observational Gait   Gait: antalgic and asymmetric   Increased right stance time and left swing time. Decreased walking speed, stride length, left stance time, right swing time, left step length and right step length.   Left foot contact pattern: foot flat  Right foot contact pattern: foot flat  Left arm swing: decreased  Right arm swing: decreased  Base of support: increased    Additional Observational Gait Details  Varus deformity of L knee    Functional Assessment      Squat    Left tibial anterior translation beyond toes and right tibial anterior translation beyond toes.     Forward Step Up 6\"   Left Leg  Increased " "contralateral push off. No pain.     Right Leg  Within functional limits.     Forward Step Down 6\"   Left Leg  Pain, contralateral trunk side bending and contralateral toe touch first.     Right Leg  Within functional limits.     Single Leg Stance   Left: 5 seconds  Right: 4 seconds                Insurance:  [employer]/CMS Eval/ Re-eval Auth #/ Referral # Total units or visits Start date  Expiration date KX? Visit limitation?  PT only or  PT+OT? Co-Insurance   CMS 4/22/24 4/9/24 4/9/25    100%                 POC Start Date POC Expiration Date Signed POC?   4/22/24 6/3/24 pending      Date 4/22 4/25 4/29 5/2 5/6 5/9 5/13 5/16       Visits/Units:  Used 1 2 3 4 5 6 7 8       Authed:  Remaining                      Precautions: CAD, PAD, aortic valve replacement, hx of cancer  HEP: Access Code [email or web address]    Date 5/16 5/13 5/9 5/6 5/2   Visit Number 8 7 6 5 4   Manuals                                        Neuro Re-Ed         Quad sets        SLR    2x10 1x15   bridges  2x10 w/march 2x10 w/march 1x15  1x15 w/march 1x15  1x15 w/march   clamshells   W/VC 2x10 B W/VC 2x10 B W/VC 2x10 B   Bent knee fallout        LAQ  5lbs 1x15 B 5lbs 2x10 B  5lbs 2x10 B   TKE        STS   2x10 from airex on chair 1x15 from chair 1x10 from chair   Mini squats  Squats on airex 1x10      wobbleboard    ML taps 3x10    Beam walking   Lateral 5 laps  Fwd & retro 5 laps Lateral 5 laps  Fwd 5 laps    Foam balance  Airex heel raises 2x10   Feet together 1x30s    No airex tandem 2x30s   Ther Ex        HS str    2x30s B 3x30s B 3x30s B   Heel slides        Standing HS curl  5lbs 1x15 B 5lbs 15x B  5lbs 2x10 B   Side stepping     At bar 3 laps   Standing heel raises   2x10     Standing hip abd   1x15 B     Modified ashleigh str  2x30s B      LTR  10x5s      HUGH str  2x30s B      Piriformis str  2x30s B      Ther Activity                        Gait Training                        Modalities                                           "

## 2024-05-20 ENCOUNTER — OFFICE VISIT (OUTPATIENT)
Dept: PHYSICAL THERAPY | Facility: CLINIC | Age: 79
End: 2024-05-20
Payer: MEDICARE

## 2024-05-20 DIAGNOSIS — M17.12 PRIMARY OSTEOARTHRITIS OF LEFT KNEE: Primary | ICD-10-CM

## 2024-05-20 DIAGNOSIS — M25.562 CHRONIC PAIN OF LEFT KNEE: ICD-10-CM

## 2024-05-20 DIAGNOSIS — G89.29 CHRONIC PAIN OF LEFT KNEE: ICD-10-CM

## 2024-05-20 PROCEDURE — 97112 NEUROMUSCULAR REEDUCATION: CPT | Performed by: PHYSICAL THERAPIST

## 2024-05-20 PROCEDURE — 97110 THERAPEUTIC EXERCISES: CPT | Performed by: PHYSICAL THERAPIST

## 2024-05-20 NOTE — PROGRESS NOTES
Daily Note     Today's date: 2024  Patient name: Jaskaran Lopez  : 1945  MRN: [id number]  Referring provider: Claribel Nye PA-C  Dx:   Encounter Diagnosis     ICD-10-CM    1. Primary osteoarthritis of left knee  M17.12       2. Chronic pain of left knee  M25.562     G89.29           Start Time: 0802  Stop Time: 0850  Total time in clinic (min): 48 minutes    Subjective: Client arrives with reports on still having periods of increased soreness after previous sessions, but denies pain. He has a new hinged brace with a valgus force which he feels is really helping reduce his pain. He states he wants to find a good level of using the brace of prevent over-reliance on it.       Objective: See treatment diary below      Assessment: Tolerated treatment well. Patient demonstrated fatigue post treatment, exhibited good technique with therapeutic exercises, and would benefit from continued PT.       Plan: Continue per plan of care.  Progress treatment as tolerated.            Insurance:  A/CMS Eval/ Re-eval Auth #/ Referral # Total units or visits Start date  Expiration date KX? Visit limitation?  PT only or  PT+OT? Co-Insurance   CMS 24    100%                 POC Start Date POC Expiration Date Signed POC?   4/22/24 6/3/24 yes   24 pending      Date        Visits/Units:  Used 1 2 3 4 5 6 7 8       Authed:  Remaining                      Precautions: CAD, PAD, aortic valve replacement, hx of cancer  HEP: Access Code [email or web address]    Date    Visit Number 9 8 7 6 5 4   Manuals                                             Neuro Re-Ed          Quad sets         SLR     2x10 1x15   bridges   2x10 w/march 2x10 w/march 1x15  1x15 w/march 1x15  1x15 w/march   clamshells    W/VC 2x10 B W/VC 2x10 B W/VC 2x10 B   Bent knee fallout         LAQ 5lbs 2x10 BLE  5lbs 1x15 B 5lbs 2x10 B  5lbs 2x10 B   TKE         STS    2x10 from airex on  chair 1x15 from chair 1x10 from chair   Mini squats Squats on airex 2x10  Squats on airex 1x10      wobbleboard     ML taps 3x10    Beam walking    Lateral 5 laps  Fwd & retro 5 laps Lateral 5 laps  Fwd 5 laps    Foam balance Airex heel raises 2x10  Airex heel raises 2x10   Feet together 1x30s    No airex tandem 2x30s   Ther Ex         HS str     2x30s B 3x30s B 3x30s B   Heel slides         Standing HS curl 5lbs 2x10 BLE  5lbs 1x15 B 5lbs 15x B  5lbs 2x10 B   Side stepping      At bar 3 laps   Standing heel raises    2x10     Standing hip abd 1x10 BLE   1x15 B     Modified ashleigh str 3x30 sec  2x30s B      LTR   10x5s      HUGH str 3x30 sec BLE  2x30s B      Piriformis str 3x30 sec BLE  2x30s B      Ther Activity                           Gait Training                           Modalities

## 2024-05-23 ENCOUNTER — OFFICE VISIT (OUTPATIENT)
Dept: PHYSICAL THERAPY | Facility: CLINIC | Age: 79
End: 2024-05-23
Payer: MEDICARE

## 2024-05-23 DIAGNOSIS — M25.562 CHRONIC PAIN OF LEFT KNEE: ICD-10-CM

## 2024-05-23 DIAGNOSIS — M17.12 PRIMARY OSTEOARTHRITIS OF LEFT KNEE: Primary | ICD-10-CM

## 2024-05-23 DIAGNOSIS — G89.29 CHRONIC PAIN OF LEFT KNEE: ICD-10-CM

## 2024-05-23 PROCEDURE — 97110 THERAPEUTIC EXERCISES: CPT | Performed by: PHYSICAL THERAPIST

## 2024-05-23 PROCEDURE — 97112 NEUROMUSCULAR REEDUCATION: CPT | Performed by: PHYSICAL THERAPIST

## 2024-05-23 NOTE — PROGRESS NOTES
Daily Note     Today's date: 2024  Patient name: Jaskaran Lopez  : 1945  MRN: [id number]  Referring provider: Claribel Nye PA-C  Dx:   Encounter Diagnosis     ICD-10-CM    1. Primary osteoarthritis of left knee  M17.12       2. Chronic pain of left knee  M25.562     G89.29           Start Time: 0900  Stop Time: 09  Total time in clinic (min): 46 minutes    Subjective: Client arrives with reports on having the same level of soreness after previous session compared to his usual soreness, even with some increase in reps. He did not wear his brace today      Objective: See treatment diary below      Assessment: Tolerated treatment well.  Modified squats today to increase holding onto TRX cable or bar. All activities completed to tolerance. He demonstrated fatigue post treatment, exhibited good technique with therapeutic exercises, and would benefit from continued PT.       Plan: Continue per plan of care.  Progress treatment as tolerated.            Insurance:  A/CMS Eval/ Re-eval Auth #/ Referral # Total units or visits Start date  Expiration date KX? Visit limitation?  PT only or  PT+OT? Co-Insurance   CMS 24    100%                 POC Start Date POC Expiration Date Signed POC?   4/22/24 6/3/24 yes   24 pending      Date        Visits/Units:  Used 1 2 3 4 5 6 7 8       Authed:  Remaining                      Precautions: CAD, PAD, aortic valve replacement, hx of cancer  HEP: Access Code [email or web address]    Date    Visit Number 10 9 8 7 6 5 4   Manuals                                                  Neuro Re-Ed           Quad sets          SLR      2x10 1x15   bridges    2x10 w/march 2x10 w/march 1x15  1x15 w/march 1x15  1x15 w/march   clamshells     W/VC 2x10 B W/VC 2x10 B W/VC 2x10 B   Bent knee fallout          LAQ 5lbs 2x10 BLE 5lbs 2x10 BLE  5lbs 1x15 B 5lbs 2x10 B  5lbs 2x10 B   TKE          STS      2x10 from airex on chair 1x15 from chair 1x10 from chair   Mini squats Squats with TRX cable / BUE support (tapping to chair) 2x10 Squats on airex 2x10  Squats on airex 1x10      wobbleboard      ML taps 3x10    Beam walking     Lateral 5 laps  Fwd & retro 5 laps Lateral 5 laps  Fwd 5 laps    Foam balance Airex heel raises 2x10 Airex heel raises 2x10  Airex heel raises 2x10   Feet together 1x30s    No airex tandem 2x30s   Ther Ex          HS str      2x30s B 3x30s B 3x30s B   Heel slides          Standing HS curl 5lbs 2x10 BLE 5lbs 2x10 BLE  5lbs 1x15 B 5lbs 15x B  5lbs 2x10 B   Side stepping       At bar 3 laps   Standing heel raises     2x10     Standing hip abd 1x10 BLE 1x10 BLE   1x15 B     Modified ashleigh str 3x30 sec 3x30 sec  2x30s B      LTR    10x5s      HUGH str 3x30 sec BLE 3x30 sec BLE  2x30s B      Piriformis str 3x30 sec BLE 3x30 sec BLE  2x30s B      Ther Activity                              Gait Training                              Modalities

## 2024-05-29 ENCOUNTER — OFFICE VISIT (OUTPATIENT)
Dept: PHYSICAL THERAPY | Facility: CLINIC | Age: 79
End: 2024-05-29
Payer: MEDICARE

## 2024-05-29 DIAGNOSIS — G89.29 CHRONIC PAIN OF LEFT KNEE: ICD-10-CM

## 2024-05-29 DIAGNOSIS — M17.12 PRIMARY OSTEOARTHRITIS OF LEFT KNEE: Primary | ICD-10-CM

## 2024-05-29 DIAGNOSIS — M25.562 CHRONIC PAIN OF LEFT KNEE: ICD-10-CM

## 2024-05-29 PROCEDURE — 97112 NEUROMUSCULAR REEDUCATION: CPT

## 2024-05-29 PROCEDURE — 97110 THERAPEUTIC EXERCISES: CPT

## 2024-05-29 NOTE — PROGRESS NOTES
Daily Note     Today's date: 2024  Patient name: Jaskaran Lopez  : 1945  MRN: [id number]  Referring provider: Claribel Nye PA-C  Dx:   Encounter Diagnosis     ICD-10-CM    1. Primary osteoarthritis of left knee  M17.12       2. Chronic pain of left knee  M25.562     G89.29           Start Time: 0850  Stop Time: 09  Total time in clinic (min): 40 minutes    Subjective: Pt reports he's not sure if he is going in the right direction with his knee. Pt states continued soreness in his knee that makes it more difficult to walk at times. Pt tries not to use his brace all the time because he doesn't want to become reliant on it. Pt isn't sure if he should make a follow-up appointment with his referring provider.       Objective: See treatment diary below      Assessment: Tolerated treatment well. Patient demonstrated fatigue post treatment, exhibited good technique with therapeutic exercises, and would benefit from continued PT. Pt was educated to make modifications at home to reduce the stress on his knee as well as the need to take rest breaks throughout the day to make sure he doesn't overdo it. Session focused on light strengthening within pain free motions. Noted knee varus during step ups, given VC and TC for proper positioning which helped relieve his pain/soreness.       Plan: Continue per plan of care.  Progress treatment as tolerated.        Insurance:  A/CMS Eval/ Re-eval Auth #/ Referral # Total units or visits Start date  Expiration date KX? Visit limitation?  PT only or  PT+OT? Co-Insurance   CMS 24    100%                 POC Start Date POC Expiration Date Signed POC?   4/22/24 6/3/24 yes   24 pending      Date  5/    Visits/Units:  Used 1 2 3 4 5 6 7 8 9 10 11    Authed:  Remaining                      Precautions: CAD, PAD, aortic valve replacement, hx of cancer  HEP: Access Code [email or web address]    Date   5/20 5/16 5/13   Visit Number 11 10 9 8 7   Manuals                                        Neuro Re-Ed         Quad sets        SLR        bridges 1x15  1x15 w/march    2x10 w/march   clamshells        Bent knee fallout        LAQ 5lbs 2x10 BLE 5lbs 2x10 BLE 5lbs 2x10 BLE  5lbs 1x15 B   TKE [place] 6.5 2x10       STS        Mini squats Squats w/BUE support 2x10 Squats with TRX cable / BUE support (tapping to chair) 2x10 Squats on airex 2x10  Squats on airex 1x10   wobbleboard        Beam walking        Foam balance  Airex heel raises 2x10 Airex heel raises 2x10  Airex heel raises 2x10   Ther Ex        HS str         Heel slides        Standing HS curl 5lbs 2x10 BLE 5lbs 2x10 BLE 5lbs 2x10 BLE  5lbs 1x15 B   Side stepping        Standing heel raises        Step ups 4in 10x  6in 5x w/VC for knee position       Standing hip abd  1x10 BLE 1x10 BLE     Modified ashleigh str  3x30 sec 3x30 sec  2x30s B   LTR     10x5s   HUGH str  3x30 sec BLE 3x30 sec BLE  2x30s B   Piriformis str  3x30 sec BLE 3x30 sec BLE  2x30s B   Ther Activity                        Gait Training                        Modalities

## 2024-05-31 ENCOUNTER — APPOINTMENT (OUTPATIENT)
Dept: PHYSICAL THERAPY | Facility: CLINIC | Age: 79
End: 2024-05-31
Payer: MEDICARE

## 2024-06-03 ENCOUNTER — OFFICE VISIT (OUTPATIENT)
Dept: PHYSICAL THERAPY | Facility: CLINIC | Age: 79
End: 2024-06-03
Payer: MEDICARE

## 2024-06-03 DIAGNOSIS — M17.12 PRIMARY OSTEOARTHRITIS OF LEFT KNEE: Primary | ICD-10-CM

## 2024-06-03 DIAGNOSIS — M25.562 CHRONIC PAIN OF LEFT KNEE: ICD-10-CM

## 2024-06-03 DIAGNOSIS — G89.29 CHRONIC PAIN OF LEFT KNEE: ICD-10-CM

## 2024-06-03 PROCEDURE — 97110 THERAPEUTIC EXERCISES: CPT

## 2024-06-03 PROCEDURE — 97112 NEUROMUSCULAR REEDUCATION: CPT

## 2024-06-03 NOTE — PROGRESS NOTES
Daily Note     Today's date: 6/3/2024  Patient name: Jaskaran Lopez  : 1945  MRN: [id number]  Referring provider: Claribel Nye PA-C  Dx:   Encounter Diagnosis     ICD-10-CM    1. Primary osteoarthritis of left knee  M17.12       2. Chronic pain of left knee  M25.562     G89.29           Start Time: 933  Stop Time: 1017  Total time in clinic (min): 44 minutes    Subjective: Pt reports no significant changes since his last treatment session, tried to pay attention more to his knee position when going up and down stairs as well as doing things around the house. Pt states not taking many breaks though. Pt states similar discomfort in his knee.       Objective: See treatment diary below      Assessment: Tolerated treatment well. Patient demonstrated fatigue post treatment, exhibited good technique with therapeutic exercises, and would benefit from continued PT. Continued working on knee positioning when performing loaded exercises with reduced pain when aligned correctly. Progressed strengthening with the leg press.       Plan: Continue per plan of care.  Progress treatment as tolerated.        Insurance:  AMA/CMS Eval/ Re-eval Auth #/ Referral # Total units or visits Start date  Expiration date KX? Visit limitation?  PT only or  PT+OT? Co-Insurance   CMS 24    100%                 POC Start Date POC Expiration Date Signed POC?   4/22/24 6/3/24 yes   24 pending      Date 5/2 5/6 5/9 5/13 5/16 5/20 5/23 5/29 6/3   Visits/Units:  Used 4 5 6 7 8 9 10 11 12   Authed:  Remaining                   Precautions: CAD, PAD, aortic valve replacement, hx of cancer  HEP: Access Code [email or web address]    Date 6/3 5/29 5/23 5/20 5/16 5/13   Visit Number 12 11 10 9 8 7   Manuals                                             Neuro Re-Ed          Quad sets         SLR         bridges  1x15  1x15 w/march    2x10 w/march   clamshells         Bent knee fallout         BIODEX LOS static 2x        LAQ 5lbs 2x10  Rt forehead laceration irrigated with 200 ml of saline    BLE 5lbs 2x10 BLE 5lbs 2x10 BLE 5lbs 2x10 BLE  5lbs 1x15 B   TKE [place] 6.5 3x10 Orange 6.5 2x10       STS 1x10 w/VC for knee position  1x10 w/10lbs        Mini squats  Squats w/BUE support 2x10 Squats with TRX cable / BUE support (tapping to chair) 2x10 Squats on airex 2x10  Squats on airex 1x10   wobbleboard         Beam walking         Foam balance   Airex heel raises 2x10 Airex heel raises 2x10  Airex heel raises 2x10   Ther Ex         HS str          Heel slides         Standing HS curl 5lbs 2x10 BLE 5lbs 2x10 BLE 5lbs 2x10 BLE 5lbs 2x10 BLE  5lbs 1x15 B   Side stepping         Standing heel raises         Step ups Step up and down 6in 1x5 stopped down due to pain  6in 1x10 up only    8in 1x10 up only 4in 10x  6in 5x w/VC for knee position       Standing hip abd   1x10 BLE 1x10 BLE     Modified ashleigh str   3x30 sec 3x30 sec  2x30s B   LTR      10x5s   HUGH str   3x30 sec BLE 3x30 sec BLE  2x30s B   Piriformis str   3x30 sec BLE 3x30 sec BLE  2x30s B   Leg Press DL 100lbs 3x10  SL 50lbs 1x10 BLE  SL 45lbs 1x10 BLE        Ther Activity                           Gait Training                           Modalities

## 2024-06-05 ENCOUNTER — APPOINTMENT (OUTPATIENT)
Dept: PHYSICAL THERAPY | Facility: CLINIC | Age: 79
End: 2024-06-05
Payer: MEDICARE

## 2024-06-10 ENCOUNTER — OFFICE VISIT (OUTPATIENT)
Dept: PHYSICAL THERAPY | Facility: CLINIC | Age: 79
End: 2024-06-10
Payer: MEDICARE

## 2024-06-10 ENCOUNTER — TELEPHONE (OUTPATIENT)
Dept: PHYSICAL THERAPY | Facility: CLINIC | Age: 79
End: 2024-06-10

## 2024-06-10 DIAGNOSIS — M25.562 CHRONIC PAIN OF LEFT KNEE: ICD-10-CM

## 2024-06-10 DIAGNOSIS — G89.29 CHRONIC PAIN OF LEFT KNEE: ICD-10-CM

## 2024-06-10 DIAGNOSIS — M17.12 PRIMARY OSTEOARTHRITIS OF LEFT KNEE: Primary | ICD-10-CM

## 2024-06-10 NOTE — TELEPHONE ENCOUNTER
Pt wanted to discuss pausing PT for now as his knee pain continues and slightly worsens. Pt wants to go back to Dr. Jones to discuss possible injections or surgery, and if need be return to PT afterward. Pt was informed to continue HEP in the meantime.

## 2024-06-13 ENCOUNTER — APPOINTMENT (OUTPATIENT)
Dept: PHYSICAL THERAPY | Facility: CLINIC | Age: 79
End: 2024-06-13
Payer: MEDICARE

## 2024-06-17 ENCOUNTER — APPOINTMENT (OUTPATIENT)
Dept: PHYSICAL THERAPY | Facility: CLINIC | Age: 79
End: 2024-06-17
Payer: MEDICARE

## 2024-06-18 VITALS
WEIGHT: 250 LBS | DIASTOLIC BLOOD PRESSURE: 74 MMHG | HEART RATE: 74 BPM | HEIGHT: 70 IN | BODY MASS INDEX: 35.79 KG/M2 | SYSTOLIC BLOOD PRESSURE: 147 MMHG

## 2024-06-18 DIAGNOSIS — M17.12 PRIMARY OSTEOARTHRITIS OF LEFT KNEE: Primary | ICD-10-CM

## 2024-06-18 PROCEDURE — 99213 OFFICE O/P EST LOW 20 MIN: CPT | Performed by: ORTHOPAEDIC SURGERY

## 2024-06-18 RX ORDER — ASPIRIN 81 MG/1
81 TABLET, CHEWABLE ORAL DAILY
COMMUNITY

## 2024-06-18 NOTE — PROGRESS NOTES
Ortho Sports Medicine Follow Up Patient Visit     Assesment:   79 y.o. male with severe left primary knee osteoarthritis    Plan:    We reviewed that, ultimately, the patient's extent of arthritic changes make him a candidate for a knee replacement. Because his symptoms have not responded to various non-operative interventions and is affecting his quality of life, I believe considering a knee replacement is reasonable at this time. He did obtain surgical clearance from his Cardiologist. The patient does have a relationship with an out-of-network joint replacement specialist that he is going to further discuss surgical options with. In the meantime, the patient can continue activities as tolerated with use of medial  brace, Tylenol, and transitioning to HEP. We will plan to follow up with Jaskaran as needed.        Chief Complaint   Patient presents with    Left Knee - Follow-up       History of Present Illness:    The patient is a 79 y.o. male presenting for follow up evaluation of left primary knee osteoarthritis following a course of PT. Today, the patient reports a 5/10 pain at best with Tylenol. He has been wearing the medial  brace as needed for activities. He notes enjoying PT sessions, but obtained minimal symptomatic improvement. The patient had 3 prior steroid injections and 1 series of Visco injections without significant relief. He is interested in exploring surgical options given his incomplete symptomatic relief following various non-operative treatments. He does feel his pain negatively impacts his quality of life. The patient did obtain cardiac clearance for surgical consideration. He does have a joint replacement specialist who he trusts with his care.     The patient's PMH is significant for s/p TAVR, HLD, obesity, prostate cancer. The patient is on Plavix at baseline following his procedure. He denies recent chest pain, SOB, or dyspnea. The patient also denies history of diabetes and  is a former smoker. He has not smoked for at least 45 years.      Knee Surgical History:  None    Past Medical, Social and Family History:  Past Medical History:   Diagnosis Date    Coronary artery disease 2023    PAD (peripheral artery disease) (HCC) 2024    both legs     Past Surgical History:   Procedure Laterality Date    PROSTATE CANCER GENE 3(PCA3) (HISTORICAL)  2022    REPLACEMENT AORTIC VALVE TRANSCATHETER (TAVR)  2023     No Known Allergies  Current Outpatient Medications on File Prior to Visit   Medication Sig Dispense Refill    aspirin 81 mg chewable tablet Chew 81 mg daily      clopidogrel (PLAVIX) 75 mg tablet       loratadine (CLARITIN) 10 mg tablet Take 10 mg by mouth daily      rosuvastatin (CRESTOR) 20 MG tablet Take 20 mg by mouth daily      azithromycin (ZITHROMAX) 250 mg tablet  (Patient not taking: Reported on 6/18/2024)       No current facility-administered medications on file prior to visit.     Social History     Socioeconomic History    Marital status: /Civil Union     Spouse name: Not on file    Number of children: Not on file    Years of education: Not on file    Highest education level: Not on file   Occupational History    Not on file   Tobacco Use    Smoking status: Never    Smokeless tobacco: Never   Vaping Use    Vaping status: Never Used   Substance and Sexual Activity    Alcohol use: Yes     Comment: occ    Drug use: Never    Sexual activity: Not on file   Other Topics Concern    Not on file   Social History Narrative    Not on file     Social Determinants of Health     Financial Resource Strain: Not on file   Food Insecurity: Not on file   Transportation Needs: Not on file   Physical Activity: Not on file   Stress: Not on file   Social Connections: Not on file   Intimate Partner Violence: Not on file   Housing Stability: Not on file         I have reviewed the past medical, surgical, social and family history, medications and allergies as documented in the EMR.    Review of  "systems: ROS is negative other than that noted in the HPI.  Constitutional: Negative for fatigue and fever.   HENT: Negative for sore throat.    Respiratory: Negative for shortness of breath.    Cardiovascular: Negative for chest pain.   Gastrointestinal: Negative for abdominal pain.   Endocrine: Negative for cold intolerance and heat intolerance.   Genitourinary: Negative for flank pain.   Musculoskeletal: Negative for back pain.   Skin: Negative for rash.   Allergic/Immunologic: Negative for immunocompromised state.   Neurological: Negative for dizziness.   Psychiatric/Behavioral: Negative for agitation.      Physical Exam:    Height 5' 10\" (1.778 m), weight 113 kg (250 lb).    General/Constitutional: NAD, well developed, well nourished  HENT: Normocephalic, atraumatic  CV: Intact distal pulses, regular rate  Resp: No respiratory distress or labored breathing  Lymphatic: No lymphadenopathy palpated  Neuro: Alert and Oriented x 3, no focal deficits  Psych: Normal mood, normal affect  Skin: Warm, dry, no rashes, no erythema      Knee Exam:   No significant skin lesions or deformity. No significant joint effusion. Range of motion from 0 to 125.  Mild-moderate medial joint line tenderness. Valgus gapping with firm endpoint to valgus stress. Knee is stable to varus stress. Neurovascularly intact distally      Knee Imaging    X-rays of the left knee reviewed and interpreted today. These show no acute fractures. Severe medial compartment and mild patellofemoral compartment degenerative changes. The patella is well centered on the trochlea.    MRI report of the left knee reviewed today from 3/19/24, which demonstrates complex tearof the body and posterior horn of the medial meniscus. Partial tearing of medial meniscus anterior root. Medial compartment joint space narrowing with diffuse full-thickness cartilage loss and subchondral edema. High-grade patellofemoral chondromalacia. MCL sprain. Patellar tendinopathy.     "

## 2024-06-20 ENCOUNTER — APPOINTMENT (OUTPATIENT)
Dept: PHYSICAL THERAPY | Facility: CLINIC | Age: 79
End: 2024-06-20
Payer: MEDICARE

## 2024-06-24 ENCOUNTER — APPOINTMENT (OUTPATIENT)
Dept: PHYSICAL THERAPY | Facility: CLINIC | Age: 79
End: 2024-06-24
Payer: MEDICARE

## 2024-06-27 ENCOUNTER — APPOINTMENT (OUTPATIENT)
Dept: PHYSICAL THERAPY | Facility: CLINIC | Age: 79
End: 2024-06-27
Payer: MEDICARE